# Patient Record
Sex: MALE | Race: WHITE | NOT HISPANIC OR LATINO | Employment: OTHER | URBAN - METROPOLITAN AREA
[De-identification: names, ages, dates, MRNs, and addresses within clinical notes are randomized per-mention and may not be internally consistent; named-entity substitution may affect disease eponyms.]

---

## 2019-10-02 ENCOUNTER — OFFICE VISIT (OUTPATIENT)
Dept: CARDIOLOGY | Facility: CLINIC | Age: 84
End: 2019-10-02

## 2019-10-02 VITALS
OXYGEN SATURATION: 94 % | BODY MASS INDEX: 28.17 KG/M2 | WEIGHT: 208 LBS | HEIGHT: 72 IN | SYSTOLIC BLOOD PRESSURE: 102 MMHG | HEART RATE: 70 BPM | DIASTOLIC BLOOD PRESSURE: 61 MMHG

## 2019-10-02 DIAGNOSIS — I47.20 VENTRICULAR TACHYCARDIA (HCC): ICD-10-CM

## 2019-10-02 DIAGNOSIS — I35.1 NONRHEUMATIC AORTIC VALVE INSUFFICIENCY: Primary | ICD-10-CM

## 2019-10-02 DIAGNOSIS — I25.10 CORONARY ARTERY DISEASE INVOLVING NATIVE CORONARY ARTERY OF NATIVE HEART WITHOUT ANGINA PECTORIS: ICD-10-CM

## 2019-10-02 DIAGNOSIS — Z95.810 BIVENTRICULAR IMPLANTABLE CARDIOVERTER-DEFIBRILLATOR IN SITU: ICD-10-CM

## 2019-10-02 DIAGNOSIS — R60.0 EDEMA LEG: ICD-10-CM

## 2019-10-02 DIAGNOSIS — N18.9 CHRONIC RENAL FAILURE, UNSPECIFIED CKD STAGE: ICD-10-CM

## 2019-10-02 DIAGNOSIS — I49.5 SICK SINUS SYNDROME (HCC): ICD-10-CM

## 2019-10-02 DIAGNOSIS — R06.09 DYSPNEA ON EXERTION: ICD-10-CM

## 2019-10-02 DIAGNOSIS — I25.5 ISCHEMIC CARDIOMYOPATHY: ICD-10-CM

## 2019-10-02 DIAGNOSIS — I36.1 NONRHEUMATIC TRICUSPID VALVE REGURGITATION: ICD-10-CM

## 2019-10-02 DIAGNOSIS — E78.5 DYSLIPIDEMIA: ICD-10-CM

## 2019-10-02 DIAGNOSIS — I10 ESSENTIAL HYPERTENSION: ICD-10-CM

## 2019-10-02 DIAGNOSIS — Z79.02 LONG TERM CURRENT USE OF ANTITHROMBOTICS/ANTIPLATELETS: ICD-10-CM

## 2019-10-02 DIAGNOSIS — I34.0 NONRHEUMATIC MITRAL VALVE REGURGITATION: ICD-10-CM

## 2019-10-02 PROCEDURE — 93000 ELECTROCARDIOGRAM COMPLETE: CPT | Performed by: INTERNAL MEDICINE

## 2019-10-02 PROCEDURE — 99214 OFFICE O/P EST MOD 30 MIN: CPT | Performed by: INTERNAL MEDICINE

## 2019-10-02 RX ORDER — FUROSEMIDE 20 MG/1
TABLET ORAL EVERY 24 HOURS
COMMUNITY
Start: 2018-11-07 | End: 2020-01-01

## 2019-10-02 RX ORDER — NITROGLYCERIN 0.4 MG/1
0.4 TABLET SUBLINGUAL
COMMUNITY

## 2019-10-02 RX ORDER — SIMVASTATIN 40 MG
20 TABLET ORAL DAILY
COMMUNITY

## 2019-10-02 RX ORDER — ASPIRIN 325 MG
325 TABLET, DELAYED RELEASE (ENTERIC COATED) ORAL DAILY
COMMUNITY

## 2019-10-02 RX ORDER — METOPROLOL TARTRATE 50 MG/1
TABLET, FILM COATED ORAL EVERY 12 HOURS
COMMUNITY
Start: 2018-08-08

## 2019-10-02 RX ORDER — LEVOTHYROXINE SODIUM 0.03 MG/1
25 TABLET ORAL DAILY
COMMUNITY

## 2019-10-02 RX ORDER — ACETAMINOPHEN 160 MG
TABLET,DISINTEGRATING ORAL DAILY
COMMUNITY
Start: 2017-03-01

## 2019-10-02 RX ORDER — OMEPRAZOLE 20 MG/1
20 CAPSULE, DELAYED RELEASE ORAL DAILY
COMMUNITY

## 2019-10-02 RX ORDER — OXYCODONE HYDROCHLORIDE AND ACETAMINOPHEN 5; 325 MG/1; MG/1
TABLET ORAL EVERY 6 HOURS
COMMUNITY
Start: 2018-07-19

## 2019-10-02 NOTE — PROGRESS NOTES
Cardiology Office Visit      Encounter Date:  10/02/2019    Patient ID:   Aristeo Mackenzie is a 89 y.o. male.    Reason For Followup:  Cardiomyopathy  Coronary artery disease  Valvular heart disease    Brief Clinical History:  Dear Dr. Corea, Tyler Sawyer MD    I had the pleasure of seeing Aristeo Mackenzie today. As you are well aware, this is a 89 y.o. male with a known history of coronary occlusive disease. He is status post coronary arterial bypass grafting in 2005. Following his surgery he had sustained ventricular tachycardia and had an ICD placed for secondary prevention.  he has additional history then includes valvular heart disease, ischemic cardiomyopathy, dyslipidemia, and peripheral edema.  He presents today for follow-up on the above conditions.    Interval History:  The patient denies any chest pain. He reports his shortness of breath and dyspnea on exertion continues unchanged from previous visit. he reports shortness of breath with activities of daily living. He denies any PND orthopnea. He denies any syncope or near syncope.  His edema remains controlled.    He reports it was suggested that he undergo a 2D echocardiogram to evaluate potentially worsening heart failure and valvular disease.  His most recent echocardiogram was about a year ago and he reports no worsening symptoms since that time.  Additionally he is unlikely that any findings would change the course of therapy based on his current symptomatology.    Assessment & Plan    Impressions:  Coronary artery disease status post coronary arterial bypass grafting in 2005.  Sustained ventricular tachycardia status post ICD placement.  Aortic insufficiency-severe on most recent echocardiogram in 2015.  Cardiomyopathy. Most recent ejection fraction of 40-45%. Etiology ischemic versus valvular.  Dyslipidemia. Unknown recent lipid status.  Hypothyroidism.  Antiplatelet therapy.  Peripheral edema-improved  ICD in situ.    Recommendations:  Continuation  "of his current medical regimen at the present time.  Follow-up in 6 months time sooner should there be difficulties.      Objective:    Vitals:  Vitals:    10/02/19 1122   BP: 102/61   Pulse: 70   SpO2: 94%   Weight: 94.3 kg (208 lb)   Height: 182.9 cm (72\")       Physical Exam:    General: Alert, cooperative, no distress, appears stated age  Head:  Normocephalic, atraumatic, mucous membranes moist  Eyes:  Conjunctiva/corneas clear, EOM's intact     Neck:  Supple,  no adenopathy;      Lungs: Coarse and diminished bilaterally, no wheezes rhonchi rales are noted  Chest wall: No tenderness  Heart::  Regular rate and rhythm, S1 and S2 normal, 2/6 systolic ejection murmur.  No, rub or gallop  Abdomen: Soft, non-tender, nondistended bowel sounds active  Extremities: No cyanosis, clubbing.  Trace to 1+ edema  Pulses: 2+ and symmetric all extremities  Skin:  No rashes or lesions  Neuro/psych: A&O x3. CN II through XII are grossly intact with appropriate affect      Allergies:  No Known Allergies    Medication Review:     Current Outpatient Medications:   •  aspirin  MG tablet, Take 325 mg by mouth Daily., Disp: , Rfl:   •  Cholecalciferol (VITAMIN D3) 2000 units capsule, Daily., Disp: , Rfl:   •  Cyanocobalamin 1000 MCG/ML kit, Every 30 (Thirty) Days., Disp: , Rfl:   •  docusate sodium (COLACE) 50 MG capsule, Take 50 mg by mouth. 2 tabs daily, Disp: , Rfl:   •  furosemide (LASIX) 20 MG tablet, Daily. As needed, Disp: , Rfl:   •  levothyroxine (SYNTHROID, LEVOTHROID) 25 MCG tablet, Take 25 mcg by mouth Daily., Disp: , Rfl:   •  MECLIZINE HCL PO, As Needed., Disp: , Rfl:   •  Melatonin 5 MG capsule, MELATONIN 5 MG CAPS, Disp: , Rfl:   •  metoprolol tartrate (LOPRESSOR) 50 MG tablet, Every 12 (Twelve) Hours., Disp: , Rfl:   •  nitroglycerin (NITROSTAT) 0.4 MG SL tablet, Place 0.4 mg under the tongue., Disp: , Rfl:   •  omeprazole (priLOSEC) 20 MG capsule, Take 20 mg by mouth Daily., Disp: , Rfl:   •  " oxyCODONE-acetaminophen (PERCOCET) 5-325 MG per tablet, Every 6 (Six) Hours., Disp: , Rfl:   •  simvastatin (ZOCOR) 40 MG tablet, Take 20 mg by mouth Daily., Disp: , Rfl:     Family History:  Family History   Problem Relation Age of Onset   • Heart disease Father    • Heart disease Brother    • Heart disease Brother        Past Medical History:  Past Medical History:   Diagnosis Date   • Aortic insufficiency    • Cardiomyopathy (CMS/HCC)    • Coronary artery disease    • Hyperlipidemia    • Hypothyroidism        Past surgical History:  Past Surgical History:   Procedure Laterality Date   • CARDIAC CATHETERIZATION     • CARDIAC DEFIBRILLATOR PLACEMENT     • CORONARY ANGIOPLASTY     • CORONARY ARTERY BYPASS GRAFT     • CORONARY STENT PLACEMENT     • INSERT / REPLACE / REMOVE PACEMAKER         Social History:  Social History     Socioeconomic History   • Marital status:      Spouse name: Not on file   • Number of children: Not on file   • Years of education: Not on file   • Highest education level: Not on file   Tobacco Use   • Smoking status: Former Smoker     Packs/day: 3.00     Last attempt to quit:      Years since quittin.7   • Smokeless tobacco: Never Used   Substance and Sexual Activity   • Alcohol use: No     Frequency: Never   • Drug use: No       Review of Systems:  The following systems were reviewed as they relate to the cardiovascular system: Constitutional, Eyes, ENT, Cardiovascular, Respiratory, Gastrointestinal, Integumentary, Neurological, Psychiatric, Hematologic, Endocrine, Musculoskeletal, and Genitourinary. The pertinent cardiovascular findings are reported above with all other cardiovascular points within those systems being negative.    Diagnostic Study Review:     Current Electrocardiogram:    ECG 12 Lead  Date/Time: 10/2/2019 1:02 PM  Performed by: Ricky Box DO  Authorized by: Ricky Box DO   Comparison: not compared with previous ECG    Previous ECG: no previous ECG available  Comments: Normal sinus rhythm with a ventricular rate of 70 bpm.  Inferior infarct age indeterminate.  Normal QT and QTc intervals.  Normal QRS axis.              NOTE: The following portions of the patient's history were reviewed and updated this visit as appropriate: allergies, current medications, past family history, past medical history, past social history, past surgical history and problem list.

## 2020-01-01 ENCOUNTER — CLINICAL SUPPORT NO REQUIREMENTS (OUTPATIENT)
Dept: CARDIOLOGY | Facility: CLINIC | Age: 85
End: 2020-01-01

## 2020-01-01 ENCOUNTER — LAB REQUISITION (OUTPATIENT)
Dept: LAB | Facility: HOSPITAL | Age: 85
End: 2020-01-01

## 2020-01-01 ENCOUNTER — OFFICE VISIT (OUTPATIENT)
Dept: CARDIOLOGY | Facility: CLINIC | Age: 85
End: 2020-01-01

## 2020-01-01 VITALS
OXYGEN SATURATION: 94 % | HEIGHT: 71 IN | RESPIRATION RATE: 18 BRPM | SYSTOLIC BLOOD PRESSURE: 92 MMHG | HEART RATE: 84 BPM | DIASTOLIC BLOOD PRESSURE: 55 MMHG | BODY MASS INDEX: 28 KG/M2 | WEIGHT: 200 LBS

## 2020-01-01 VITALS
HEART RATE: 69 BPM | BODY MASS INDEX: 28 KG/M2 | SYSTOLIC BLOOD PRESSURE: 111 MMHG | HEIGHT: 71 IN | DIASTOLIC BLOOD PRESSURE: 55 MMHG | WEIGHT: 200 LBS

## 2020-01-01 DIAGNOSIS — I34.0 NONRHEUMATIC MITRAL VALVE REGURGITATION: ICD-10-CM

## 2020-01-01 DIAGNOSIS — I25.10 CORONARY ARTERY DISEASE INVOLVING NATIVE CORONARY ARTERY OF NATIVE HEART WITHOUT ANGINA PECTORIS: ICD-10-CM

## 2020-01-01 DIAGNOSIS — Z95.810 BIVENTRICULAR IMPLANTABLE CARDIOVERTER-DEFIBRILLATOR IN SITU: ICD-10-CM

## 2020-01-01 DIAGNOSIS — I25.5 ISCHEMIC CARDIOMYOPATHY: Primary | ICD-10-CM

## 2020-01-01 DIAGNOSIS — I47.20 VENTRICULAR TACHYCARDIA (HCC): Primary | ICD-10-CM

## 2020-01-01 DIAGNOSIS — I10 ESSENTIAL HYPERTENSION: ICD-10-CM

## 2020-01-01 DIAGNOSIS — I49.5 SICK SINUS SYNDROME (HCC): ICD-10-CM

## 2020-01-01 DIAGNOSIS — D64.9 ANEMIA, UNSPECIFIED: ICD-10-CM

## 2020-01-01 DIAGNOSIS — I35.1 NONRHEUMATIC AORTIC VALVE INSUFFICIENCY: ICD-10-CM

## 2020-01-01 DIAGNOSIS — E78.5 HYPERLIPIDEMIA, UNSPECIFIED: ICD-10-CM

## 2020-01-01 DIAGNOSIS — I95.9 HYPOTENSION, UNSPECIFIED: ICD-10-CM

## 2020-01-01 DIAGNOSIS — I25.5 ISCHEMIC CARDIOMYOPATHY: ICD-10-CM

## 2020-01-01 DIAGNOSIS — I47.20 VENTRICULAR TACHYCARDIA (HCC): ICD-10-CM

## 2020-01-01 DIAGNOSIS — I42.9 CARDIOMYOPATHY, UNSPECIFIED TYPE (HCC): ICD-10-CM

## 2020-01-01 LAB
ALBUMIN SERPL-MCNC: 3.1 G/DL (ref 3.5–5.2)
ALBUMIN/GLOB SERPL: 1.1 G/DL
ALP SERPL-CCNC: 140 U/L (ref 39–117)
ALT SERPL W P-5'-P-CCNC: 31 U/L (ref 1–41)
ANION GAP SERPL CALCULATED.3IONS-SCNC: 11 MMOL/L (ref 5–15)
ANISOCYTOSIS BLD QL: ABNORMAL
ANISOCYTOSIS BLD QL: ABNORMAL
AST SERPL-CCNC: 36 U/L (ref 1–40)
BILIRUB SERPL-MCNC: 0.9 MG/DL (ref 0–1.2)
BUN SERPL-MCNC: 16 MG/DL (ref 8–23)
BUN SERPL-MCNC: ABNORMAL MG/DL
BUN/CREAT SERPL: ABNORMAL
CALCIUM SPEC-SCNC: 8.1 MG/DL (ref 8.2–9.6)
CHLORIDE SERPL-SCNC: 102 MMOL/L (ref 98–107)
CO2 SERPL-SCNC: 24 MMOL/L (ref 22–29)
CREAT SERPL-MCNC: 0.97 MG/DL (ref 0.76–1.27)
DEPRECATED RDW RBC AUTO: 60.8 FL (ref 37–54)
DEPRECATED RDW RBC AUTO: 64.3 FL (ref 37–54)
EOSINOPHIL # BLD MANUAL: 0.06 10*3/MM3 (ref 0–0.4)
EOSINOPHIL NFR BLD MANUAL: 1 % (ref 0.3–6.2)
ERYTHROCYTE [DISTWIDTH] IN BLOOD BY AUTOMATED COUNT: 16.6 % (ref 12.3–15.4)
ERYTHROCYTE [DISTWIDTH] IN BLOOD BY AUTOMATED COUNT: 17.6 % (ref 12.3–15.4)
GFR SERPL CREATININE-BSD FRML MDRD: 72 ML/MIN/1.73
GIANT PLATELETS: ABNORMAL
GIANT PLATELETS: ABNORMAL
GLOBULIN UR ELPH-MCNC: 2.8 GM/DL
GLUCOSE SERPL-MCNC: 121 MG/DL (ref 65–99)
HCT VFR BLD AUTO: 25.7 % (ref 37.5–51)
HCT VFR BLD AUTO: 29.4 % (ref 37.5–51)
HGB BLD-MCNC: 8.6 G/DL (ref 13–17.7)
HGB BLD-MCNC: 9.7 G/DL (ref 13–17.7)
LYMPHOCYTES # BLD MANUAL: 0.38 10*3/MM3 (ref 0.7–3.1)
LYMPHOCYTES # BLD MANUAL: 0.81 10*3/MM3 (ref 0.7–3.1)
LYMPHOCYTES NFR BLD MANUAL: 12 % (ref 5–12)
LYMPHOCYTES NFR BLD MANUAL: 13 % (ref 19.6–45.3)
LYMPHOCYTES NFR BLD MANUAL: 6 % (ref 5–12)
LYMPHOCYTES NFR BLD MANUAL: 9 % (ref 19.6–45.3)
MACROCYTES BLD QL SMEAR: ABNORMAL
MCH RBC QN AUTO: 34.7 PG (ref 26.6–33)
MCH RBC QN AUTO: 35.3 PG (ref 26.6–33)
MCHC RBC AUTO-ENTMCNC: 33.1 G/DL (ref 31.5–35.7)
MCHC RBC AUTO-ENTMCNC: 33.5 G/DL (ref 31.5–35.7)
MCV RBC AUTO: 104.8 FL (ref 79–97)
MCV RBC AUTO: 105.4 FL (ref 79–97)
MONOCYTES # BLD AUTO: 0.37 10*3/MM3 (ref 0.1–0.9)
MONOCYTES # BLD AUTO: 0.5 10*3/MM3 (ref 0.1–0.9)
NEUTROPHILS # BLD AUTO: 3.32 10*3/MM3 (ref 1.7–7)
NEUTROPHILS # BLD AUTO: 4.96 10*3/MM3 (ref 1.7–7)
NEUTROPHILS NFR BLD MANUAL: 76 % (ref 42.7–76)
NEUTROPHILS NFR BLD MANUAL: 79 % (ref 42.7–76)
NEUTS BAND NFR BLD MANUAL: 4 % (ref 0–5)
PLATELET # BLD AUTO: 133 10*3/MM3 (ref 140–450)
PLATELET # BLD AUTO: 160 10*3/MM3 (ref 140–450)
PMV BLD AUTO: 10.3 FL (ref 6–12)
PMV BLD AUTO: 11.2 FL (ref 6–12)
POIKILOCYTOSIS BLD QL SMEAR: ABNORMAL
POLYCHROMASIA BLD QL SMEAR: ABNORMAL
POTASSIUM SERPL-SCNC: 4.2 MMOL/L (ref 3.5–5.2)
PROT SERPL-MCNC: 5.9 G/DL (ref 6–8.5)
RBC # BLD AUTO: 2.44 10*6/MM3 (ref 4.14–5.8)
RBC # BLD AUTO: 2.81 10*6/MM3 (ref 4.14–5.8)
SCAN SLIDE: NORMAL
SODIUM SERPL-SCNC: 137 MMOL/L (ref 136–145)
WBC # BLD AUTO: 4.2 10*3/MM3 (ref 3.4–10.8)
WBC # BLD AUTO: 6.2 10*3/MM3 (ref 3.4–10.8)
WBC MORPH BLD: NORMAL
WBC MORPH BLD: NORMAL

## 2020-01-01 PROCEDURE — 93296 REM INTERROG EVL PM/IDS: CPT | Performed by: INTERNAL MEDICINE

## 2020-01-01 PROCEDURE — 99442 PR PHYS/QHP TELEPHONE EVALUATION 11-20 MIN: CPT | Performed by: INTERNAL MEDICINE

## 2020-01-01 PROCEDURE — 93295 DEV INTERROG REMOTE 1/2/MLT: CPT | Performed by: INTERNAL MEDICINE

## 2020-01-01 PROCEDURE — 80053 COMPREHEN METABOLIC PANEL: CPT | Performed by: INTERNAL MEDICINE

## 2020-01-01 PROCEDURE — 99214 OFFICE O/P EST MOD 30 MIN: CPT | Performed by: INTERNAL MEDICINE

## 2020-01-01 PROCEDURE — 85007 BL SMEAR W/DIFF WBC COUNT: CPT | Performed by: INTERNAL MEDICINE

## 2020-01-01 PROCEDURE — 93000 ELECTROCARDIOGRAM COMPLETE: CPT | Performed by: INTERNAL MEDICINE

## 2020-01-01 PROCEDURE — 85025 COMPLETE CBC W/AUTO DIFF WBC: CPT | Performed by: INTERNAL MEDICINE

## 2020-01-01 PROCEDURE — 85027 COMPLETE CBC AUTOMATED: CPT | Performed by: INTERNAL MEDICINE

## 2020-01-01 RX ORDER — FUROSEMIDE 20 MG/1
TABLET ORAL
Qty: 90 TABLET | Refills: 0 | Status: SHIPPED | OUTPATIENT
Start: 2020-01-01 | End: 2020-01-01 | Stop reason: SDUPTHER

## 2020-01-01 RX ORDER — FUROSEMIDE 20 MG/1
20 TABLET ORAL DAILY
Qty: 90 TABLET | Refills: 0 | Status: SHIPPED | OUTPATIENT
Start: 2020-01-01

## 2020-01-01 RX ORDER — METHOCARBAMOL 500 MG/1
500 TABLET, FILM COATED ORAL 3 TIMES DAILY
COMMUNITY

## 2020-05-11 NOTE — PROGRESS NOTES
Cardiology Telephone Visit    Encounter Date:  05/11/2020    Patient ID:   Aristeo Mackenzie is a 89 y.o. male.    Reason For Followup:  Coronary artery disease  Cardiomyopathy  Aortic insufficiency    Brief Clinical History:  Dear Dr. Corea, Tyler Sawyer MD    I had the pleasure of performing a telephone visit with Aristeo Mackenzie today. As you are well aware, this is a 89 y.o. male with a known history of coronary occlusive disease. He is status post coronary arterial bypass grafting in 2005. Following his surgery he had sustained ventricular tachycardia and had an ICD placed for secondary prevention.  he has additional history then includes valvular heart disease, ischemic cardiomyopathy, dyslipidemia, and peripheral edema.  He presents today for follow-up on the above conditions.     Interval History:  The patient denies any chest pain. He reports his shortness of breath and dyspnea on exertion continues unchanged from previous visit. He reports shortness of breath with activities of daily living. He denies any PND orthopnea. He denies any syncope or near syncope.  His edema remains controlled.  He reports his biggest limitation is his mobility.  He will be getting a motorized scooter to help him get around the house.     He reports it was suggested that he undergo a 2D echocardiogram to evaluate potentially worsening heart failure and valvular disease.  His most recent echocardiogram was about a year ago and he reports no worsening symptoms since that time.  Additionally he is unlikely that any findings would change the course of therapy based on his current symptomatology and advanced age.    He will be celebrating his 89th birthday next month as well as his 75th wedding anniversary the following day.     Assessment & Plan     Impressions:  Coronary artery disease status post coronary arterial bypass grafting in 2005.  Sustained ventricular tachycardia status post ICD placement.  Aortic insufficiency-severe on  "most recent echocardiogram in 2015.  Cardiomyopathy. Most recent ejection fraction of 40-45%. Etiology ischemic versus valvular.  Dyslipidemia. Unknown recent lipid status.  Hypothyroidism.  Antiplatelet therapy.  Peripheral edema-improved  ICD in situ.     Recommendations:  Continuation of his current medical regimen at the present time.  Follow-up in 6 months time sooner should there be difficulties.    Vitals:  Vitals:    05/11/20 1437   BP: 111/55   Pulse: 69   Weight: 90.7 kg (200 lb)   Height: 180.3 cm (71\")       Allergies:  No Known Allergies    Medication Review:     Current Outpatient Medications:   •  aspirin  MG tablet, Take 325 mg by mouth Daily., Disp: , Rfl:   •  Cholecalciferol (VITAMIN D3) 2000 units capsule, Daily., Disp: , Rfl:   •  Cyanocobalamin 1000 MCG/ML kit, Every 30 (Thirty) Days., Disp: , Rfl:   •  docusate sodium (COLACE) 50 MG capsule, Take 50 mg by mouth. 2 tabs daily, Disp: , Rfl:   •  furosemide (LASIX) 20 MG tablet, Daily. As needed, Disp: , Rfl:   •  levothyroxine (SYNTHROID, LEVOTHROID) 25 MCG tablet, Take 25 mcg by mouth Daily., Disp: , Rfl:   •  Melatonin 5 MG capsule, MELATONIN 5 MG CAPS, Disp: , Rfl:   •  metoprolol tartrate (LOPRESSOR) 50 MG tablet, Every 12 (Twelve) Hours., Disp: , Rfl:   •  nitroglycerin (NITROSTAT) 0.4 MG SL tablet, Place 0.4 mg under the tongue., Disp: , Rfl:   •  omeprazole (priLOSEC) 20 MG capsule, Take 20 mg by mouth Daily., Disp: , Rfl:   •  oxyCODONE-acetaminophen (PERCOCET) 5-325 MG per tablet, Every 6 (Six) Hours., Disp: , Rfl:   •  simvastatin (ZOCOR) 40 MG tablet, Take 20 mg by mouth Daily., Disp: , Rfl:   •  MECLIZINE HCL PO, As Needed., Disp: , Rfl:     Family History:  Family History   Problem Relation Age of Onset   • Heart disease Father    • Heart disease Brother    • Heart disease Brother        Past Medical History:  Past Medical History:   Diagnosis Date   • Aortic insufficiency    • Cardiomyopathy (CMS/HCC)    • Coronary artery " disease    • Hyperlipidemia    • Hypothyroidism        Past surgical History:  Past Surgical History:   Procedure Laterality Date   • CARDIAC CATHETERIZATION     • CARDIAC DEFIBRILLATOR PLACEMENT     • CORONARY ANGIOPLASTY     • CORONARY ARTERY BYPASS GRAFT     • CORONARY STENT PLACEMENT     • INSERT / REPLACE / REMOVE PACEMAKER         Social History:  Social History     Socioeconomic History   • Marital status:      Spouse name: Not on file   • Number of children: Not on file   • Years of education: Not on file   • Highest education level: Not on file   Tobacco Use   • Smoking status: Former Smoker     Packs/day: 3.00     Last attempt to quit:      Years since quittin.3   • Smokeless tobacco: Never Used   Substance and Sexual Activity   • Alcohol use: No     Frequency: Never   • Drug use: No       Review of Systems:  The following systems were reviewed as they relate to the cardiovascular system: Constitutional, Eyes, ENT, Cardiovascular, Respiratory, Gastrointestinal, Integumentary, Neurological, Psychiatric, Hematologic, Endocrine, Musculoskeletal, and Genitourinary. The pertinent cardiovascular findings are reported above with all other cardiovascular points within those systems being negative.    Diagnostic Study Review:     Current Electrocardiogram:  Procedures      NOTE: The following portions of the patient's history were reviewed and updated this visit as appropriate: allergies, current medications, past family history, past medical history, past social history, past surgical history and problem list.    A total of 15 minutes of medical discussion occurred during this encounter.      Novel Coronavirus (COVID-19) Disclaimer:     A proclamation declaring a national emergency concerning the Novel Coronavirus Disease (COVID-19) Outbreak was issued on 2020 at the direction of the .    This virtual visit was performed with the patient's consent in lieu of  the patient's regularly scheduled appointment in order to provide the patient with the opportunity to maintain contact with their healthcare provider while also adhering to social distancing guidelines set forth by the CDC to reduce exposure to the Novel Coronavirus (COVID-19).  Any vital signs obtained during this visit were provided by the patient.

## 2020-06-01 NOTE — PROGRESS NOTES
Cardiology Office Visit      Encounter Date:  06/01/2020    Patient ID:   Aristeo Mackenzie is a 89 y.o. male.    Reason For Followup:  Coronary artery disease  Cardiomyopathy  Aortic insufficiency  Atrial fibrillation    Brief Clinical History:  Dear Dr. Corea, Tyler Sawyer MD    It is my pleasure seeing patient in the office today. As you are well aware, this is a 89 y.o. male with a known history of coronary occlusive disease. He is status post coronary arterial bypass grafting in 2005. Following his surgery he had sustained ventricular tachycardia and had an ICD placed for secondary prevention.  he has additional history then includes valvular heart disease, ischemic cardiomyopathy, dyslipidemia, and peripheral edema.  He presents today for follow-up on the above conditions.     Interval History:     He reports it was suggested that he undergo a 2D echocardiogram to evaluate potentially worsening heart failure and valvular disease.  His most recent echocardiogram was about a year ago and he reports no worsening symptoms since that time.      Shortness of breath somewhat improved with Lasix 20 mg p.o. once a day.  Unsteady gait   Denies any new complaints other than some shortness of breath  No chest pain  No dizziness or syncope  Blood pressure systolic generally runs around /unchanged from before      assessment & Plan     Impressions:  Coronary artery disease status post coronary arterial bypass grafting in 2005.  Sustained ventricular tachycardia status post ICD placement.  Aortic insufficiency-severe on most recent echocardiogram in 2015.  Cardiomyopathy. Most recent ejection fraction of 40-45%. Etiology ischemic versus valvular.  Dyslipidemia. Unknown recent lipid status.  Hypothyroidism.  Antiplatelet therapy.  Peripheral edema-improved  ICD in situ.  Newly diagnosed atrial fibrillation-controlled ventricular response     Recommendations:  Continue Lasix 20 mg p.o. once a day which is helping with the  "shortness of breath  Recent labs at [facility] normal per patient  Labs at [facility] every 6 months  Advised patient to keep a close eye on the blood pressure and notify if there is any low blood pressure readings  Continue close monitoring  New diagnosis of atrial fibrillation treatment options discussed with patient  Patient theoretically will benefit from anticoagulation therapy for stroke prophylaxis secondary to elevated Eriberto vascular score  Patient is concerned about his bleeding risk and also frequent falls  He prefers not to take anticoagulation therapy at this time  Risk benefits and alternatives discussed with the patient  Continue aspirin for anticoagulation therapy  Follow-up in office in 3 months    Objective:    Vitals:  Vitals:    06/01/20 1118   BP: 92/55   BP Location: Left arm   Pulse: 84   Resp: 18   SpO2: 94%   Weight: 90.7 kg (200 lb)   Height: 180.3 cm (71\")       Physical Exam:    General: Alert, cooperative, no distress, appears stated age/frail patient sitting in the wheelchair  Head:  Normocephalic, atraumatic, mucous membranes moist  Eyes:  Conjunctiva/corneas clear, EOM's intact     Neck:  Supple,  no adenopathy;      Lungs: Clear to auscultation bilaterally, no wheezes rhonchi rales are noted  Chest wall: No tenderness  Heart::  irregular rate and rhythm, S1 and S2 normal, displaced LV apical impulse  Abdomen: Soft, non-tender, nondistended bowel sounds active  Extremities: No cyanosis, clubbing, or edema  Pulses: 2+ and symmetric all extremities  Skin:  No rashes or lesions  Neuro/psych: A&O x3. CN II through XII are grossly intact with appropriate affect      Allergies:  No Known Allergies    Medication Review:     Current Outpatient Medications:   •  aspirin  MG tablet, Take 325 mg by mouth Daily., Disp: , Rfl:   •  Cholecalciferol (VITAMIN D3) 2000 units capsule, Daily., Disp: , Rfl:   •  Cyanocobalamin 1000 MCG/ML kit, Every 30 (Thirty) Days., Disp: , Rfl:   •  " docusate sodium (COLACE) 50 MG capsule, Take 50 mg by mouth. 2 tabs daily, Disp: , Rfl:   •  furosemide (LASIX) 20 MG tablet, Daily. As needed, Disp: , Rfl:   •  levothyroxine (SYNTHROID, LEVOTHROID) 25 MCG tablet, Take 25 mcg by mouth Daily., Disp: , Rfl:   •  MECLIZINE HCL PO, As Needed., Disp: , Rfl:   •  Melatonin 5 MG capsule, MELATONIN 5 MG CAPS, Disp: , Rfl:   •  methocarbamol (ROBAXIN) 500 MG tablet, Take 500 mg by mouth 3 (Three) Times a Day., Disp: , Rfl:   •  metoprolol tartrate (LOPRESSOR) 50 MG tablet, Every 12 (Twelve) Hours., Disp: , Rfl:   •  nitroglycerin (NITROSTAT) 0.4 MG SL tablet, Place 0.4 mg under the tongue., Disp: , Rfl:   •  omeprazole (priLOSEC) 20 MG capsule, Take 20 mg by mouth Daily., Disp: , Rfl:   •  oxyCODONE-acetaminophen (PERCOCET) 5-325 MG per tablet, Every 6 (Six) Hours., Disp: , Rfl:   •  simvastatin (ZOCOR) 40 MG tablet, Take 20 mg by mouth Daily., Disp: , Rfl:     Family History:  Family History   Problem Relation Age of Onset   • Heart disease Father    • Heart disease Brother    • Heart disease Brother        Past Medical History:  Past Medical History:   Diagnosis Date   • Aortic insufficiency    • Cardiomyopathy (CMS/HCC)    • Coronary artery disease    • Hyperlipidemia    • Hypothyroidism        Past surgical History:  Past Surgical History:   Procedure Laterality Date   • CARDIAC CATHETERIZATION     • CARDIAC DEFIBRILLATOR PLACEMENT     • CORONARY ANGIOPLASTY     • CORONARY ARTERY BYPASS GRAFT     • CORONARY STENT PLACEMENT     • INSERT / REPLACE / REMOVE PACEMAKER         Social History:  Social History     Socioeconomic History   • Marital status:      Spouse name: Not on file   • Number of children: Not on file   • Years of education: Not on file   • Highest education level: Not on file   Tobacco Use   • Smoking status: Former Smoker     Packs/day: 3.00     Last attempt to quit:      Years since quittin.4   • Smokeless tobacco: Never Used   Substance  and Sexual Activity   • Alcohol use: No     Frequency: Never   • Drug use: No       Review of Systems:  The following systems were reviewed as they relate to the cardiovascular system: Constitutional, Eyes, ENT, Cardiovascular, Respiratory, Gastrointestinal, Integumentary, Neurological, Psychiatric, Hematologic, Endocrine, Musculoskeletal, and Genitourinary. The pertinent cardiovascular findings are reported above with all other cardiovascular points within those systems being negative.    Diagnostic Study Review:     Current Electrocardiogram:    ECG 12 Lead  Date/Time: 6/1/2020 12:03 PM  Performed by: Justen Duke MD  Authorized by: Justen Duke MD   Comparison: compared with previous ECG   Comparison to previous ECG: Atrial fibrillation is new compared to prior EKG  Rhythm: atrial fibrillation  Rate: normal  BPM: 84  Conduction: conduction normal  QRS axis: normal  Other findings: non-specific ST-T wave changes    Clinical impression: abnormal EKG